# Patient Record
Sex: FEMALE | Race: AMERICAN INDIAN OR ALASKA NATIVE
[De-identification: names, ages, dates, MRNs, and addresses within clinical notes are randomized per-mention and may not be internally consistent; named-entity substitution may affect disease eponyms.]

---

## 2019-09-25 ENCOUNTER — HOSPITAL ENCOUNTER (EMERGENCY)
Dept: HOSPITAL 5 - ED | Age: 29
Discharge: HOME | End: 2019-09-25
Payer: COMMERCIAL

## 2019-09-25 VITALS — SYSTOLIC BLOOD PRESSURE: 117 MMHG | DIASTOLIC BLOOD PRESSURE: 80 MMHG

## 2019-09-25 DIAGNOSIS — M94.0: ICD-10-CM

## 2019-09-25 DIAGNOSIS — Z79.899: ICD-10-CM

## 2019-09-25 DIAGNOSIS — N39.0: ICD-10-CM

## 2019-09-25 DIAGNOSIS — N76.0: Primary | ICD-10-CM

## 2019-09-25 LAB
ALBUMIN SERPL-MCNC: 4.1 G/DL (ref 3.9–5)
ALT SERPL-CCNC: 23 UNITS/L (ref 7–56)
APTT BLD: 29.3 SEC. (ref 24.2–36.6)
BACTERIA #/AREA URNS HPF: (no result) /HPF
BASOPHILS # (AUTO): 0.1 K/MM3 (ref 0–0.1)
BASOPHILS NFR BLD AUTO: 1.1 % (ref 0–1.8)
BILIRUB UR QL STRIP: (no result)
BLOOD UR QL VISUAL: (no result)
BUN SERPL-MCNC: 7 MG/DL (ref 7–17)
BUN/CREAT SERPL: 10 %
CALCIUM SERPL-MCNC: 9.1 MG/DL (ref 8.4–10.2)
EOSINOPHIL # BLD AUTO: 0.1 K/MM3 (ref 0–0.4)
EOSINOPHIL NFR BLD AUTO: 1 % (ref 0–4.3)
HCT VFR BLD CALC: 38.4 % (ref 30.3–42.9)
HEMOLYSIS INDEX: 7
HGB BLD-MCNC: 12.8 GM/DL (ref 10.1–14.3)
INR PPP: 1.01 (ref 0.87–1.13)
LYMPHOCYTES # BLD AUTO: 1.9 K/MM3 (ref 1.2–5.4)
LYMPHOCYTES NFR BLD AUTO: 25.8 % (ref 13.4–35)
MCHC RBC AUTO-ENTMCNC: 34 % (ref 30–34)
MCV RBC AUTO: 90 FL (ref 79–97)
MONOCYTES # (AUTO): 0.6 K/MM3 (ref 0–0.8)
MONOCYTES % (AUTO): 7.8 % (ref 0–7.3)
MUCOUS THREADS #/AREA URNS HPF: (no result) /HPF
PH UR STRIP: 5 [PH] (ref 5–7)
PLATELET # BLD: 252 K/MM3 (ref 140–440)
PROT UR STRIP-MCNC: (no result) MG/DL
RBC # BLD AUTO: 4.29 M/MM3 (ref 3.65–5.03)
RBC #/AREA URNS HPF: 4 /HPF (ref 0–6)
UROBILINOGEN UR-MCNC: < 2 MG/DL (ref ?–2)
WBC #/AREA URNS HPF: 13 /HPF (ref 0–6)

## 2019-09-25 PROCEDURE — 84484 ASSAY OF TROPONIN QUANT: CPT

## 2019-09-25 PROCEDURE — 99285 EMERGENCY DEPT VISIT HI MDM: CPT

## 2019-09-25 PROCEDURE — 93005 ELECTROCARDIOGRAM TRACING: CPT

## 2019-09-25 PROCEDURE — 85025 COMPLETE CBC W/AUTO DIFF WBC: CPT

## 2019-09-25 PROCEDURE — 87210 SMEAR WET MOUNT SALINE/INK: CPT

## 2019-09-25 PROCEDURE — 96372 THER/PROPH/DIAG INJ SC/IM: CPT

## 2019-09-25 PROCEDURE — 71046 X-RAY EXAM CHEST 2 VIEWS: CPT

## 2019-09-25 PROCEDURE — 87086 URINE CULTURE/COLONY COUNT: CPT

## 2019-09-25 PROCEDURE — 87591 N.GONORRHOEAE DNA AMP PROB: CPT

## 2019-09-25 PROCEDURE — 93010 ELECTROCARDIOGRAM REPORT: CPT

## 2019-09-25 PROCEDURE — 85610 PROTHROMBIN TIME: CPT

## 2019-09-25 PROCEDURE — 36415 COLL VENOUS BLD VENIPUNCTURE: CPT

## 2019-09-25 PROCEDURE — 81001 URINALYSIS AUTO W/SCOPE: CPT

## 2019-09-25 PROCEDURE — 80053 COMPREHEN METABOLIC PANEL: CPT

## 2019-09-25 PROCEDURE — 84703 CHORIONIC GONADOTROPIN ASSAY: CPT

## 2019-09-25 PROCEDURE — 85730 THROMBOPLASTIN TIME PARTIAL: CPT

## 2019-09-25 NOTE — EVENT NOTE
ED Screening Note


Date of service: 09/25/19


Time: 16:00


ED Screening Note: 


29 y o female presents with mid chest pain x 2 days


positive nausea





This initial assessment/diagnostic orders/clinical plan/treatment(s) is/are 

subject to change based on patients health status, clinical progression and re-

assessment by fellow clinical providers in the ED. Further treatment and workup 

at subsequent clinical providers discretion. Patient/guardian urged not to elope

from the ED as their condition may be serious if not clinically assessed and 

managed. 





Initial orders include: 


labs

## 2019-09-25 NOTE — XRAY REPORT
CHEST 2 VIEWS 



INDICATION / CLINICAL INFORMATION:

Chest Pain.



COMPARISON: 

None available.



FINDINGS:



SUPPORT DEVICES: None.

HEART / MEDIASTINUM: No significant abnormality. 

LUNGS / PLEURA: No significant pulmonary or pleural abnormality. No pneumothorax. 



ADDITIONAL FINDINGS: No significant additional findings.



IMPRESSION:

1. No acute findings.



Signer Name: Carlitos Randolph MD 

Signed: 9/25/2019 7:31 PM

 Workstation Name: [id number]

## 2019-09-25 NOTE — EMERGENCY DEPARTMENT REPORT
ED Chest Pain HPI





- General


Chief Complaint: Chest Pain


Stated Complaint: OPEN SORES/CHEST PAIN


Time Seen by Provider: 09/25/19 15:54


Source: patient


Mode of arrival: Ambulatory


Limitations: No Limitations





- History of Present Illness


Initial Comments: 





This is a 29-year-old female nontoxic, well nourished in appearance, no acute 

signs of distress presents to the ED with c/o of intermittent midsternum chest 

pain that is aggravated with movement and palpation resolved with rest patient 

also has a secondary complaint of vaginal itching, irritation and discharge.  

Patient that it was here 3 days ago and received antibiotics for a UTI and since

then had vaginal symptoms.  Patient deneis any vaginal lesions or ulcers.   

Patient denies any urinary symptoms.  Patient denies any back pain or flank 

pain.  Patient staetd is still taking antibiotics for UTI.  Patient denies any 

abdominal pain or pelvic pain.  Patient denies any radiation of pain.  Patient 

describes pain as aching intermittently.  Patient denies any upper respiratory 

symptoms.  Patient denies any shortness of breath, hemoptysis, fever, chills, 

nausea, vomiting, headache, stiff neck, numbness, tingling, abdominal pain.  

Patient denies pleuritic chest pain.  Patient denies any recent travels or long 

car rides.  Patient denies any recent surgeries or any sick contacts.  Patient 

denies any drug allergies.  Patient denies significant past medical history.


MD Complaint: chest pain


-: days(s) (3)


Pain Location: substernal


Pain Radiation: none


Severity: mild


Severity scale (0 -10): 3


Quality: aching


Consistency: intermittent


Improves With: rest


Worsens With: palpation, movement


re: denies: nausea, vomting, diaphoresis, dyspnea, sense of impending doom


Other Symptoms: denies: cough, fever, syncope, rash, acid taste in mouth, leg 

swelling, palpitations, burping


Treatments Prior to Arrival: none


Aspirin use within the Past 7 Days: (0) No





- Related Data


                                  Previous Rx's











 Medication  Instructions  Recorded  Last Taken  Type


 


Nitrofurantoin Mono/M-Cryst 100 mg PO Q12HR #20 capsule 09/20/19 Unknown Rx





[Macrobid CAP]    


 


Phenazopyridine [Pyridium] 200 mg PO TID #9 tab 09/20/19 Unknown Rx


 


metroNIDAZOLE [Flagyl] 500 mg PO Q12HR #14 tab 09/25/19 Unknown Rx











                                    Allergies











Allergy/AdvReac Type Severity Reaction Status Date / Time


 


No Known Allergies Allergy   Verified 09/25/19 17:30














Heart Score





- HEART Score


History: Slightly suspicious


EKG: Normal


Age: < 45


Risk factors: No known risk factors


Troponin: < normal limit


HEART Score: 0





ED Review of Systems


ROS: 


Stated complaint: OPEN SORES/CHEST PAIN


Other details as noted in HPI





Constitutional: denies: chills, fever


Eyes: denies: eye pain, eye discharge, vision change


ENT: denies: ear pain, throat pain


Respiratory: denies: cough, shortness of breath, wheezing


Cardiovascular: chest pain.  denies: palpitations


Endocrine: no symptoms reported


Gastrointestinal: denies: abdominal pain, nausea, diarrhea


Genitourinary: other (vaginal sore).  denies: urgency, dysuria, discharge


Musculoskeletal: denies: back pain, joint swelling, arthralgia


Skin: denies: rash, lesions


Neurological: denies: headache, weakness, paresthesias


Psychiatric: denies: anxiety, depression


Hematological/Lymphatic: denies: easy bleeding, easy bruising





ED Past Medical Hx





- Past Medical History


Previous Medical History?: No





- Surgical History


Past Surgical History?: No





- Social History


Smoking Status: Never Smoker


Substance Use Type: None





- Medications


Home Medications: 


                                Home Medications











 Medication  Instructions  Recorded  Confirmed  Last Taken  Type


 


Nitrofurantoin Mono/M-Cryst 100 mg PO Q12HR #20 capsule 09/20/19  Unknown Rx





[Macrobid CAP]     


 


Phenazopyridine [Pyridium] 200 mg PO TID #9 tab 09/20/19  Unknown Rx


 


metroNIDAZOLE [Flagyl] 500 mg PO Q12HR #14 tab 09/25/19  Unknown Rx














ED Physical Exam





- General


Limitations: No Limitations


General appearance: alert, in no apparent distress





- Head


Head exam: Present: atraumatic, normocephalic





- Eye


Eye exam: Present: normal appearance





- Neck


Neck exam: Present: normal inspection, full ROM.  Absent: tenderness, 

meningismus, lymphadenopathy





- Respiratory


Respiratory exam: Present: normal lung sounds bilaterally, chest wall tenderness

 (midsternum).  Absent: respiratory distress, wheezes, rales, rhonchi, stridor, 

accessory muscle use, decreased breath sounds, prolonged expiratory





- Cardiovascular


Cardiovascular Exam: Present: regular rate, normal rhythm, normal heart sounds. 

 Absent: bradycardia, tachycardia, irregular rhythm, systolic murmur, diastolic 

murmur, rubs, gallop





- GI/Abdominal


GI/Abdominal exam: Present: soft, normal bowel sounds.  Absent: distended, 

tenderness, guarding, rebound, rigid, diminished bowel sounds





- 


External exam: Present: normal external exam, other (chaperone Tiffanie RN 

during present during exam).  Absent: erythema, swelling, lesions, lacerations, 

ecchymosis, bleeding


Speculum exam: Present: cervical discharge, other (chaperone Tiffanie RN during 

present during exam).  Absent: erythema, vaginal discharge, vaginal bleeding, 

foreign body, tissue, laceration


Bi-manual exam: Present: normal bi-manual exam, other (chaperone Tiffanie RN 

during present during exam).  Absent: cervical motion tendernes, adnexal 

tenderness, adnexal mass, uterine enlargement, uterine tenderness





- Extremities Exam


Extremities exam: Present: normal inspection, full ROM





- Back Exam


Back exam: Present: normal inspection, full ROM.  Absent: tenderness, CVA 

tenderness (R), CVA tenderness (L), muscle spasm, paraspinal tenderness, 

vertebral tenderness, rash noted





- Neurological Exam


Neurological exam: Present: alert, oriented X3, normal gait





- Psychiatric


Psychiatric exam: Present: normal affect, normal mood





- Skin


Skin exam: Present: warm, dry, intact, normal color.  Absent: rash





ED Course


                                   Vital Signs











  09/25/19





  16:00


 


Temperature 98.7 F


 


Pulse Rate 79


 


Respiratory 16





Rate 


 


Blood Pressure 111/68


 


O2 Sat by Pulse 99





Oximetry 














- Reevaluation(s)


Reevaluation #1: 





09/25/19 19:15


Patient is speaking in full sentences with no signs of distress noted.





ALEX score





- Alex Score


Age > 65: (0) No


Aspirin use within the Past 7 Days: (0) No


3 or more CAD Risk Factors: (0) No


2 or more Angina events in past 24 hrs: (0) No


Known CAD with more than 50% Stenosis: (0) No


Elevated Cardiac Markers: (0) No


ST Deviation Greater than 0.5mm: (0) No


ALEX Score: 0





ED Medical Decision Making





- Lab Data


Result diagrams: 


                                 09/25/19 18:14





                                 09/25/19 18:14





- Medical Decision Making





This is a 29-year-old female that presents with costochondritis and BV.  Patient

 is stable and was examined by me.  ALEX and HEART score 0 pints. Wells criteria

 for DVT/SVT/PE 0 points. EKG normal sinus rhythm with no significant changes in

 ST.  Chest xray dictated by the radiologist. PAtient is notified of the Xray 

report with no questions noted.  Labs within normal limits. Negative troponin.  

There is no abdominal tenderness.  No pelvic pain.  UA obtained.  Wet prep 

obtained.  Urine culture pending.  Patient is taking Macrobid for UTI now.  

Gonorrhea chlamydia swab pending.  Patient was instructed to return in 3-5 days 

for GC results.  Patient wanted empirical treatment so patient received 250 mg 

Rocephin and 1 g of azithromycin by mouth.  Patient received Motrin in the ED 

which she stated his symptoms are improving subsided. Patient was instructed to 

Follow-up with a primary care/cardiologist doctor in 3-5 days or if symptoms 

worsen and continue return to emergency room as soon as possible.  At time of 

discharge, the patient does not seem toxic or ill in appearance.  No acute signs

 of distress noted.  Patient agrees to discharge treatment plan of care.  No 

further questions noted by the patient.





Critical care attestation.: 


If time is entered above; I have spent that time in minutes in the direct care 

of this critically ill patient, excluding procedure time.








ED Disposition


Clinical Impression: 


 Atypical chest pain, Bacterial vaginosis, Possible exposure to STD





Disposition: DC-01 TO HOME OR SELFCARE


Is pt being admited?: No


Does the pt Need Aspirin: No


Condition: Stable


Instructions:  Bacterial Vaginosis (ED), Metronidazole (By mouth)


Additional Instructions: 


Follow-up with a primary care and cardiologist doctor in 2-3 days or if symptoms

 worsen and continue return to emergency room as soon as possible. 


Prescriptions: 


metroNIDAZOLE [Flagyl] 500 mg PO Q12HR #14 tab


Referrals: 


PRIMARY CARE,MD [Primary Care Provider] - 3-5 Days


[facility] [Outside] - 3-5 Days


[facility] [Outside] - 3-5 Days


KEVIN HIDALGO MD [Staff Physician] - 2-3 Days


LISHA BEDOYA MD [Staff Physician] - 2-3 Days


Forms:  Work/School Release Form(ED)